# Patient Record
Sex: FEMALE | Race: WHITE | Employment: FULL TIME | URBAN - METROPOLITAN AREA
[De-identification: names, ages, dates, MRNs, and addresses within clinical notes are randomized per-mention and may not be internally consistent; named-entity substitution may affect disease eponyms.]

---

## 2018-06-30 PROBLEM — J02.9 SORE THROAT: Status: ACTIVE | Noted: 2018-06-30

## 2018-06-30 PROBLEM — R09.81 NASAL CONGESTION: Status: ACTIVE | Noted: 2018-06-30

## 2018-06-30 PROBLEM — R05.9 COUGH: Status: ACTIVE | Noted: 2018-06-30

## 2018-06-30 PROBLEM — J01.00 ACUTE NON-RECURRENT MAXILLARY SINUSITIS: Status: ACTIVE | Noted: 2018-06-30

## 2019-06-03 PROCEDURE — 87046 STOOL CULTR AEROBIC BACT EA: CPT | Performed by: PHYSICIAN ASSISTANT

## 2019-06-03 PROCEDURE — 87045 FECES CULTURE AEROBIC BACT: CPT | Performed by: PHYSICIAN ASSISTANT

## 2019-06-03 PROCEDURE — 89055 LEUKOCYTE ASSESSMENT FECAL: CPT | Performed by: PHYSICIAN ASSISTANT

## 2019-10-24 LAB — AMB EXT HPV: NEGATIVE

## 2022-11-02 LAB — AMB EXT HPV: NEGATIVE

## 2023-04-04 ENCOUNTER — OFFICE VISIT (OUTPATIENT)
Dept: FAMILY MEDICINE CLINIC | Facility: CLINIC | Age: 53
End: 2023-04-04
Payer: COMMERCIAL

## 2023-04-04 VITALS
DIASTOLIC BLOOD PRESSURE: 70 MMHG | BODY MASS INDEX: 46.86 KG/M2 | SYSTOLIC BLOOD PRESSURE: 124 MMHG | OXYGEN SATURATION: 98 % | TEMPERATURE: 98 F | HEART RATE: 86 BPM | RESPIRATION RATE: 16 BRPM | HEIGHT: 60.5 IN | WEIGHT: 245 LBS

## 2023-04-04 DIAGNOSIS — E66.01 CLASS 3 SEVERE OBESITY DUE TO EXCESS CALORIES WITHOUT SERIOUS COMORBIDITY WITH BODY MASS INDEX (BMI) OF 45.0 TO 49.9 IN ADULT (HCC): ICD-10-CM

## 2023-04-04 DIAGNOSIS — Z12.11 COLON CANCER SCREENING: ICD-10-CM

## 2023-04-04 DIAGNOSIS — Z12.31 ENCOUNTER FOR SCREENING MAMMOGRAM FOR MALIGNANT NEOPLASM OF BREAST: ICD-10-CM

## 2023-04-04 DIAGNOSIS — Z00.00 ROUTINE MEDICAL EXAM: Primary | ICD-10-CM

## 2023-04-04 DIAGNOSIS — J45.30 MILD PERSISTENT ASTHMA WITHOUT COMPLICATION: ICD-10-CM

## 2023-04-04 DIAGNOSIS — Z23 NEED FOR VACCINATION: ICD-10-CM

## 2023-04-04 DIAGNOSIS — Z00.00 LABORATORY EXAM ORDERED AS PART OF ROUTINE GENERAL MEDICAL EXAMINATION: ICD-10-CM

## 2023-04-04 DIAGNOSIS — Z83.49 FAMILY HISTORY OF THYROID DISEASE: ICD-10-CM

## 2023-04-04 DIAGNOSIS — Z01.84 IMMUNITY STATUS TESTING: ICD-10-CM

## 2023-04-04 PROBLEM — R05.9 COUGH: Status: RESOLVED | Noted: 2018-06-30 | Resolved: 2023-04-04

## 2023-04-04 PROBLEM — J02.9 SORE THROAT: Status: RESOLVED | Noted: 2018-06-30 | Resolved: 2023-04-04

## 2023-04-04 PROBLEM — R09.81 NASAL CONGESTION: Status: RESOLVED | Noted: 2018-06-30 | Resolved: 2023-04-04

## 2023-04-04 PROBLEM — J01.00 ACUTE NON-RECURRENT MAXILLARY SINUSITIS: Status: RESOLVED | Noted: 2018-06-30 | Resolved: 2023-04-04

## 2023-04-04 PROCEDURE — 90472 IMMUNIZATION ADMIN EACH ADD: CPT | Performed by: FAMILY MEDICINE

## 2023-04-04 PROCEDURE — 99203 OFFICE O/P NEW LOW 30 MIN: CPT | Performed by: FAMILY MEDICINE

## 2023-04-04 PROCEDURE — 90715 TDAP VACCINE 7 YRS/> IM: CPT | Performed by: FAMILY MEDICINE

## 2023-04-04 PROCEDURE — 3074F SYST BP LT 130 MM HG: CPT | Performed by: FAMILY MEDICINE

## 2023-04-04 PROCEDURE — 90471 IMMUNIZATION ADMIN: CPT | Performed by: FAMILY MEDICINE

## 2023-04-04 PROCEDURE — 3078F DIAST BP <80 MM HG: CPT | Performed by: FAMILY MEDICINE

## 2023-04-04 PROCEDURE — 3008F BODY MASS INDEX DOCD: CPT | Performed by: FAMILY MEDICINE

## 2023-04-04 PROCEDURE — 99386 PREV VISIT NEW AGE 40-64: CPT | Performed by: FAMILY MEDICINE

## 2023-04-04 RX ORDER — PHENTERMINE HYDROCHLORIDE 30 MG/1
30 CAPSULE ORAL EVERY MORNING
Qty: 30 CAPSULE | Refills: 0 | Status: SHIPPED | OUTPATIENT
Start: 2023-04-04

## 2023-04-04 RX ORDER — ALBUTEROL SULFATE 90 UG/1
2 AEROSOL, METERED RESPIRATORY (INHALATION) EVERY 6 HOURS PRN
Qty: 1 EACH | Refills: 2 | Status: SHIPPED | OUTPATIENT
Start: 2023-04-04

## 2023-04-04 RX ORDER — CETIRIZINE HYDROCHLORIDE 10 MG/1
10 TABLET ORAL DAILY
COMMUNITY

## 2023-04-04 RX ORDER — BECLOMETHASONE DIPROPIONATE
POWDER (GRAM) MISCELLANEOUS
Refills: 0 | Status: CANCELLED | OUTPATIENT
Start: 2023-04-04

## 2023-04-07 DIAGNOSIS — R73.09 ELEVATED GLUCOSE: Primary | ICD-10-CM

## 2023-04-09 LAB
ABSOLUTE BASOPHILS: 57 CELLS/UL (ref 0–200)
ABSOLUTE EOSINOPHILS: 200 CELLS/UL (ref 15–500)
ABSOLUTE LYMPHOCYTES: 2229 CELLS/UL (ref 850–3900)
ABSOLUTE MONOCYTES: 587 CELLS/UL (ref 200–950)
ABSOLUTE NEUTROPHILS: 2628 CELLS/UL (ref 1500–7800)
ALBUMIN/GLOBULIN RATIO: 1.7 (CALC) (ref 1–2.5)
ALBUMIN: 4 G/DL (ref 3.6–5.1)
ALKALINE PHOSPHATASE: 75 U/L (ref 37–153)
ALT: 26 U/L (ref 6–29)
AST: 28 U/L (ref 10–35)
BASOPHILS: 1 %
BILIRUBIN, TOTAL: 0.4 MG/DL (ref 0.2–1.2)
BUN: 13 MG/DL (ref 7–25)
CALCIUM: 8.8 MG/DL (ref 8.6–10.4)
CARBON DIOXIDE: 30 MMOL/L (ref 20–32)
CHLORIDE: 106 MMOL/L (ref 98–110)
CHOL/HDLC RATIO: 3.5 (CALC)
CHOLESTEROL, TOTAL: 176 MG/DL
CREATININE: 0.73 MG/DL (ref 0.5–1.03)
EGFR: 97 ML/MIN/1.73M2
EOSINOPHILS: 3.5 %
GLOBULIN: 2.4 G/DL (CALC) (ref 1.9–3.7)
GLUCOSE: 115 MG/DL (ref 65–99)
HDL CHOLESTEROL: 51 MG/DL
HEMATOCRIT: 40.5 % (ref 35–45)
HEMOGLOBIN A1C: 5.6 % OF TOTAL HGB
HEMOGLOBIN: 13.6 G/DL (ref 11.7–15.5)
LDL-CHOLESTEROL: 106 MG/DL (CALC)
LYMPHOCYTES: 39.1 %
MCH: 28.9 PG (ref 27–33)
MCHC: 33.6 G/DL (ref 32–36)
MCV: 86.2 FL (ref 80–100)
MONOCYTES: 10.3 %
MPV: 9.5 FL (ref 7.5–12.5)
NEUTROPHILS: 46.1 %
NON-HDL CHOLESTEROL: 125 MG/DL (CALC)
PLATELET COUNT: 277 THOUSAND/UL (ref 140–400)
POTASSIUM: 4.4 MMOL/L (ref 3.5–5.3)
PROTEIN, TOTAL: 6.4 G/DL (ref 6.1–8.1)
RDW: 13 % (ref 11–15)
RED BLOOD CELL COUNT: 4.7 MILLION/UL (ref 3.8–5.1)
SODIUM: 140 MMOL/L (ref 135–146)
TRIGLYCERIDES: 94 MG/DL
TSH: 1.17 MIU/L
VARICELLA ZOSTER VIRUS$AB (IGG): 352.7 INDEX
WHITE BLOOD CELL COUNT: 5.7 THOUSAND/UL (ref 3.8–10.8)

## 2023-04-13 ENCOUNTER — TELEPHONE (OUTPATIENT)
Dept: FAMILY MEDICINE CLINIC | Facility: CLINIC | Age: 53
End: 2023-04-13

## 2023-04-13 DIAGNOSIS — Z12.31 ENCOUNTER FOR SCREENING MAMMOGRAM FOR MALIGNANT NEOPLASM OF BREAST: Primary | ICD-10-CM

## 2023-04-13 NOTE — TELEPHONE ENCOUNTER
Received via fax the 11/2/22 Pap smear & HPV, 10/24/19 Pap & HPV & the 3/25/22 mammogram results from Dr. Ada Donaldson. Date of exam. External Results Console & Health Maintenance updated & full reports placed in Dr. Severiano Schiller in box for review.

## 2023-04-14 NOTE — TELEPHONE ENCOUNTER
Please inform patient that we received a mammogram report. It's date was 3/25/2022. patient states she had one done in January of this year but not sure if she was mistaken or if it was not included in the results from [facility] in [address] [place] [address] and [facility]  I placed an order for a mammogram as I think she is due. If she really had it done somewhere see if we can locate.

## 2023-05-04 ENCOUNTER — OFFICE VISIT (OUTPATIENT)
Dept: FAMILY MEDICINE CLINIC | Facility: CLINIC | Age: 53
End: 2023-05-04
Payer: COMMERCIAL

## 2023-05-04 VITALS
HEIGHT: 60.5 IN | SYSTOLIC BLOOD PRESSURE: 120 MMHG | DIASTOLIC BLOOD PRESSURE: 72 MMHG | OXYGEN SATURATION: 98 % | RESPIRATION RATE: 16 BRPM | BODY MASS INDEX: 44.18 KG/M2 | HEART RATE: 78 BPM | TEMPERATURE: 98 F | WEIGHT: 231 LBS

## 2023-05-04 DIAGNOSIS — J45.30 MILD PERSISTENT ASTHMA WITHOUT COMPLICATION: ICD-10-CM

## 2023-05-04 DIAGNOSIS — E66.01 CLASS 3 SEVERE OBESITY DUE TO EXCESS CALORIES WITHOUT SERIOUS COMORBIDITY WITH BODY MASS INDEX (BMI) OF 40.0 TO 44.9 IN ADULT (HCC): ICD-10-CM

## 2023-05-04 PROCEDURE — 3008F BODY MASS INDEX DOCD: CPT | Performed by: FAMILY MEDICINE

## 2023-05-04 PROCEDURE — 3078F DIAST BP <80 MM HG: CPT | Performed by: FAMILY MEDICINE

## 2023-05-04 PROCEDURE — 99213 OFFICE O/P EST LOW 20 MIN: CPT | Performed by: FAMILY MEDICINE

## 2023-05-04 PROCEDURE — 3074F SYST BP LT 130 MM HG: CPT | Performed by: FAMILY MEDICINE

## 2023-05-04 RX ORDER — PHENTERMINE HYDROCHLORIDE 30 MG/1
30 CAPSULE ORAL EVERY MORNING
Qty: 30 CAPSULE | Refills: 0 | Status: SHIPPED | OUTPATIENT
Start: 2023-05-04

## 2023-06-06 ENCOUNTER — OFFICE VISIT (OUTPATIENT)
Dept: FAMILY MEDICINE CLINIC | Facility: CLINIC | Age: 53
End: 2023-06-06
Payer: COMMERCIAL

## 2023-06-06 VITALS
DIASTOLIC BLOOD PRESSURE: 70 MMHG | TEMPERATURE: 98 F | OXYGEN SATURATION: 98 % | HEIGHT: 60.5 IN | SYSTOLIC BLOOD PRESSURE: 118 MMHG | WEIGHT: 220 LBS | RESPIRATION RATE: 16 BRPM | HEART RATE: 84 BPM | BODY MASS INDEX: 42.08 KG/M2

## 2023-06-06 DIAGNOSIS — Z23 NEED FOR VACCINATION: ICD-10-CM

## 2023-06-06 DIAGNOSIS — E66.01 CLASS 3 SEVERE OBESITY DUE TO EXCESS CALORIES WITHOUT SERIOUS COMORBIDITY WITH BODY MASS INDEX (BMI) OF 40.0 TO 44.9 IN ADULT (HCC): Primary | ICD-10-CM

## 2023-06-06 PROCEDURE — 3074F SYST BP LT 130 MM HG: CPT | Performed by: FAMILY MEDICINE

## 2023-06-06 PROCEDURE — 90750 HZV VACC RECOMBINANT IM: CPT | Performed by: FAMILY MEDICINE

## 2023-06-06 PROCEDURE — 90471 IMMUNIZATION ADMIN: CPT | Performed by: FAMILY MEDICINE

## 2023-06-06 PROCEDURE — 99213 OFFICE O/P EST LOW 20 MIN: CPT | Performed by: FAMILY MEDICINE

## 2023-06-06 PROCEDURE — 3008F BODY MASS INDEX DOCD: CPT | Performed by: FAMILY MEDICINE

## 2023-06-06 PROCEDURE — 3078F DIAST BP <80 MM HG: CPT | Performed by: FAMILY MEDICINE

## 2023-06-06 RX ORDER — PHENTERMINE HYDROCHLORIDE 30 MG/1
30 CAPSULE ORAL EVERY MORNING
Qty: 30 CAPSULE | Refills: 0 | Status: SHIPPED | OUTPATIENT
Start: 2023-06-06

## 2023-07-05 ENCOUNTER — OFFICE VISIT (OUTPATIENT)
Dept: FAMILY MEDICINE CLINIC | Facility: CLINIC | Age: 53
End: 2023-07-05
Payer: COMMERCIAL

## 2023-07-05 VITALS
OXYGEN SATURATION: 97 % | HEART RATE: 81 BPM | SYSTOLIC BLOOD PRESSURE: 112 MMHG | DIASTOLIC BLOOD PRESSURE: 70 MMHG | RESPIRATION RATE: 16 BRPM | HEIGHT: 60.5 IN | BODY MASS INDEX: 41.31 KG/M2 | WEIGHT: 216 LBS | TEMPERATURE: 98 F

## 2023-07-05 DIAGNOSIS — E66.01 CLASS 3 SEVERE OBESITY DUE TO EXCESS CALORIES WITHOUT SERIOUS COMORBIDITY WITH BODY MASS INDEX (BMI) OF 40.0 TO 44.9 IN ADULT (HCC): ICD-10-CM

## 2023-07-05 PROBLEM — E66.813 CLASS 3 SEVERE OBESITY DUE TO EXCESS CALORIES WITHOUT SERIOUS COMORBIDITY WITH BODY MASS INDEX (BMI) OF 40.0 TO 44.9 IN ADULT: Status: ACTIVE | Noted: 2023-07-05

## 2023-07-05 PROBLEM — E66.813 CLASS 3 SEVERE OBESITY DUE TO EXCESS CALORIES WITHOUT SERIOUS COMORBIDITY WITH BODY MASS INDEX (BMI) OF 40.0 TO 44.9 IN ADULT (HCC): Status: ACTIVE | Noted: 2023-07-05

## 2023-07-05 PROCEDURE — 3008F BODY MASS INDEX DOCD: CPT | Performed by: FAMILY MEDICINE

## 2023-07-05 PROCEDURE — 3074F SYST BP LT 130 MM HG: CPT | Performed by: FAMILY MEDICINE

## 2023-07-05 PROCEDURE — 3078F DIAST BP <80 MM HG: CPT | Performed by: FAMILY MEDICINE

## 2023-07-05 PROCEDURE — 99213 OFFICE O/P EST LOW 20 MIN: CPT | Performed by: FAMILY MEDICINE

## 2023-07-05 RX ORDER — PHENTERMINE HYDROCHLORIDE 30 MG/1
30 CAPSULE ORAL EVERY MORNING
Qty: 30 CAPSULE | Refills: 0 | Status: SHIPPED | OUTPATIENT
Start: 2023-07-05

## 2023-08-07 ENCOUNTER — OFFICE VISIT (OUTPATIENT)
Dept: FAMILY MEDICINE CLINIC | Facility: CLINIC | Age: 53
End: 2023-08-07
Payer: COMMERCIAL

## 2023-08-07 VITALS
DIASTOLIC BLOOD PRESSURE: 70 MMHG | WEIGHT: 208 LBS | BODY MASS INDEX: 39.78 KG/M2 | SYSTOLIC BLOOD PRESSURE: 116 MMHG | OXYGEN SATURATION: 98 % | HEART RATE: 87 BPM | HEIGHT: 60.5 IN | RESPIRATION RATE: 16 BRPM | TEMPERATURE: 98 F

## 2023-08-07 DIAGNOSIS — E66.09 CLASS 2 OBESITY DUE TO EXCESS CALORIES WITHOUT SERIOUS COMORBIDITY WITH BODY MASS INDEX (BMI) OF 39.0 TO 39.9 IN ADULT: ICD-10-CM

## 2023-08-07 PROBLEM — E66.813 CLASS 3 SEVERE OBESITY DUE TO EXCESS CALORIES WITHOUT SERIOUS COMORBIDITY WITH BODY MASS INDEX (BMI) OF 40.0 TO 44.9 IN ADULT: Status: RESOLVED | Noted: 2023-07-05 | Resolved: 2023-08-07

## 2023-08-07 PROBLEM — E66.01 CLASS 3 SEVERE OBESITY DUE TO EXCESS CALORIES WITHOUT SERIOUS COMORBIDITY WITH BODY MASS INDEX (BMI) OF 40.0 TO 44.9 IN ADULT (HCC): Status: RESOLVED | Noted: 2023-07-05 | Resolved: 2023-08-07

## 2023-08-07 PROBLEM — E66.813 CLASS 3 SEVERE OBESITY DUE TO EXCESS CALORIES WITHOUT SERIOUS COMORBIDITY WITH BODY MASS INDEX (BMI) OF 40.0 TO 44.9 IN ADULT (HCC): Status: RESOLVED | Noted: 2023-07-05 | Resolved: 2023-08-07

## 2023-08-07 RX ORDER — PHENTERMINE HYDROCHLORIDE 30 MG/1
30 CAPSULE ORAL EVERY MORNING
Qty: 30 CAPSULE | Refills: 0 | Status: SHIPPED | OUTPATIENT
Start: 2023-08-07

## 2023-09-05 ENCOUNTER — ANESTHESIA EVENT (OUTPATIENT)
Dept: ENDOSCOPY | Facility: HOSPITAL | Age: 53
End: 2023-09-05
Payer: COMMERCIAL

## 2023-09-06 ENCOUNTER — HOSPITAL ENCOUNTER (OUTPATIENT)
Facility: HOSPITAL | Age: 53
Setting detail: HOSPITAL OUTPATIENT SURGERY
Discharge: HOME | End: 2023-09-06
Attending: INTERNAL MEDICINE | Admitting: INTERNAL MEDICINE
Payer: COMMERCIAL

## 2023-09-06 ENCOUNTER — ANESTHESIA (OUTPATIENT)
Dept: ENDOSCOPY | Facility: HOSPITAL | Age: 53
End: 2023-09-06
Payer: COMMERCIAL

## 2023-09-06 VITALS
SYSTOLIC BLOOD PRESSURE: 121 MMHG | DIASTOLIC BLOOD PRESSURE: 72 MMHG | TEMPERATURE: 98 F | OXYGEN SATURATION: 100 % | BODY MASS INDEX: 40.84 KG/M2 | HEIGHT: 60 IN | HEART RATE: 76 BPM | RESPIRATION RATE: 20 BRPM | WEIGHT: 208 LBS

## 2023-09-06 DIAGNOSIS — Z12.11 COLON CANCER SCREENING: ICD-10-CM

## 2023-09-06 PROCEDURE — 0DBN8ZZ EXCISION OF SIGMOID COLON, VIA NATURAL OR ARTIFICIAL OPENING ENDOSCOPIC: ICD-10-PCS | Performed by: INTERNAL MEDICINE

## 2023-09-06 PROCEDURE — 88305 TISSUE EXAM BY PATHOLOGIST: CPT | Performed by: INTERNAL MEDICINE

## 2023-09-06 PROCEDURE — 0DBM8ZZ EXCISION OF DESCENDING COLON, VIA NATURAL OR ARTIFICIAL OPENING ENDOSCOPIC: ICD-10-PCS | Performed by: INTERNAL MEDICINE

## 2023-09-06 RX ORDER — SODIUM CHLORIDE, SODIUM LACTATE, POTASSIUM CHLORIDE, CALCIUM CHLORIDE 600; 310; 30; 20 MG/100ML; MG/100ML; MG/100ML; MG/100ML
INJECTION, SOLUTION INTRAVENOUS CONTINUOUS
Status: DISCONTINUED | OUTPATIENT
Start: 2023-09-06 | End: 2023-09-06

## 2023-09-06 RX ORDER — LIDOCAINE HYDROCHLORIDE 10 MG/ML
INJECTION, SOLUTION EPIDURAL; INFILTRATION; INTRACAUDAL; PERINEURAL AS NEEDED
Status: DISCONTINUED | OUTPATIENT
Start: 2023-09-06 | End: 2023-09-06 | Stop reason: SURG

## 2023-09-06 RX ADMIN — LIDOCAINE HYDROCHLORIDE 25 MG: 10 INJECTION, SOLUTION EPIDURAL; INFILTRATION; INTRACAUDAL; PERINEURAL at 12:32:00

## 2023-09-06 RX ADMIN — SODIUM CHLORIDE, SODIUM LACTATE, POTASSIUM CHLORIDE, CALCIUM CHLORIDE: 600; 310; 30; 20 INJECTION, SOLUTION INTRAVENOUS at 12:24:00

## 2023-09-06 NOTE — DISCHARGE INSTRUCTIONS

## 2023-09-06 NOTE — ANESTHESIA POSTPROCEDURE EVALUATION
[facility] Patient Status:  Hospital Outpatient Surgery   Age/Gender 52year old female MRN [id number]   Location [address] [facility] 28 Attending Mike Tijerina, [address] [facility] Day # 0 PCP Alcira Kendrick MD       Anesthesia Post-op Note    COLONOSCOPY with cold snare polypectomy    Procedure Summary       Date: 09/06/23 Room / Location: [facility] ENDOSCOPY 02 / [facility] ENDOSCOPY    Anesthesia Start: 0385 Anesthesia Stop: 7319    Procedure: COLONOSCOPY with cold snare polypectomy Diagnosis:       Colon cancer screening      (polyps, hemorrhoids)    Surgeons: Mike Tijerina DO Anesthesiologist: Godwin Perez MD    Anesthesia Type: MAC ASA Status: 3            Anesthesia Type: MAC    Vitals Value Taken Time   /67 09/06/23 1305   Temp 98.0 09/06/23 1307   Pulse 89 09/06/23 1307   Resp 16 09/06/23 1307   SpO2 97 % 09/06/23 1307   Vitals shown include unvalidated device data. Patient Location: Endoscopy    Anesthesia Type: MAC    Airway Patency: patent    Postop Pain Control: adequate    Mental Status: mildly sedated but able to meaningfully participate in the post-anesthesia evaluation    Nausea/Vomiting: none    Cardiopulmonary/Hydration status: stable euvolemic    Complications: no apparent anesthesia related complications    Postop vital signs: stable    Dental Exam: Unchanged from Preop    Patient to be discharged home when criteria met.

## 2023-09-06 NOTE — H&P
History & Physical Examination    Patient Name: David Porter  MRN: [id number]  CSN: [id number]  YOB: 1970    Diagnosis: screening    Present Illness: as above    Phentermine HCl 30 MG Oral Cap, Take 1 capsule (30 mg total) by mouth every morning., Disp: 30 capsule, Rfl: 0, 8/23/2023  cetirizine 10 MG Oral Tab, Take 1 tablet (10 mg total) by mouth 2 (two) times daily. , Disp: , Rfl: , 9/4/2023  PEG 3350-KCl-Na Bicarb-NaCl 420 g Oral Recon Soln, Take 4,000 mL by mouth As Directed. (Patient not taking: Reported on 7/5/2023), Disp: 4000 mL, Rfl: 0  Beclomethasone Diprop HFA 80 MCG/ACT Inhalation Aerosol, Breath Activated, Inhale 2 puffs into the lungs in the morning and 2 puffs before bedtime. , Disp: 1 each, Rfl: 5, More than a month  albuterol (PROAIR HFA) 108 (90 Base) MCG/ACT Inhalation Aero Soln, Inhale 2 puffs into the lungs every 6 (six) hours as needed for Wheezing or Shortness of Breath., Disp: 1 each, Rfl: 2, More than a month      lactated ringers infusion, , Intravenous, Continuous        Allergies:   Demerol Hcl [Meperi*    HALLUCINATION    Past Medical History:   Diagnosis Date    Back problem     Binge-eating and purging type anorexia nervosa     20-30's was bad    Mild intermittent asthma     Triggers: cold weather, URI    Mild persistent asthma without complication 53/18/9992    Osteoarthritis     Visual impairment     glasses     Past Surgical History:   Procedure Laterality Date    AMPUTATION FINGER/THUMB Right     partial 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, caught underneath a table    APPENDECTOMY      CHOLECYSTECTOMY      WISDOM TEETH REMOVED       Family History   Problem Relation Age of Onset    Hypertension Mother     Other (khari's esophagitis) Mother     High Cholesterol Father     Prostate Cancer Father     Arthritis Father     Asthma Father     No Known Problems Sister     No Known Problems Sister     No Known Problems Maternal Grandmother     Stroke Paternal Grandmother     Heart Disease Paternal Grandmother     Cancer Paternal Grandfather         lung (smoker)    Thyroid disease Maternal Aunt      Social History    Tobacco Use      Smoking status: Never      Smokeless tobacco: Never    Alcohol use: Yes      Comment: 1/2 glass wine 1/yr, never a problem      SYSTEM Check if Review is Normal Check if Physical Exam is Normal If not normal, please explain:   HEENT [ x] [x ]    NECK & BACK [ x] [ x]    HEART [ x] [x ]    LUNGS [ x] [ x]    ABDOMEN [ x] [x ]    UROGENITAL [ x] [ ] Exam declined   EXTREMITIES [ x] [x ]    OTHER        [ x ] I have discussed the risks and benefits and alternatives with the patient/family. They understand and agree to proceed with plan of care. [ x ] I have reviewed the History and Physical done within the last 30 days. Any changes noted above.     [address] [place] [address],[address], DO  9/6/2023  12:21 PM

## 2023-09-06 NOTE — OPERATIVE REPORT
Operative Report-Colonoscopy with snare polypectomy    Jabier Fairbanks 1/27/1970   [facility] [id number] MRN [id number]   Admission Date 9/6/2023 Operation Date 9/6/2023   Attending Physician Sayra Mohr DO Operating Physician Michael Rivera DO     PREOPERATIVE DIAGNOSIS/INDICATION: Screening  POSTOPERTATIVE DIAGNOSIS: Polyps, Hemorrhoids  PROCEDURE PERFORMED: COLONOSCOPY  INFORMED CONSENT:  Once a brief history and physical examination was performed, the risks, benefits and alternatives to the procedure were discussed with the patient and/or family and informed consent was obtained. The risks of sedation, perforation, missed lesions and need for surgery were all discussed. Patient expressed understanding of the risks and agreed to proceed. PROCEDURE DESCRIPTION:The patient was then brought to the endoscopy suite where her pulse, pulse oximetry and blood pressure were monitored. She was placed in the left lateral decubitus position and deep sedation was administered. Once adequate sedation was achieved, a rectal exam was performed which was normal. A lubricated tip of an Olympus video colonoscope was then inserted through the rectum and gently manipulated under direct visualization to the cecal pole and the terminal ileum. The quality of the preparation was good. Upon withdrawal of the colonoscope, careful visualization of the mucosa was performed. [place] Prep Score: Right Colon 3 Transverse colon 3 Left colon 3  FINDINGS/THERAPEUTICS:  TERMINAL ILEUM: The Terminal Ileum was normal.   COLON: There were two 5-6 mm, sessile polyps in the descending colon which were removed with the cold snare. There was a 5 mm, sessile polyp in the sigmoid polyp which was removed with the cold snare. Two passes were made to the right colon. RECTUM: Grade I Internal hemorrhoids.   RECOMMENDATIONS:   Post Colonoscopy/polypectomy precautions, watch for bleeding, infection, perforation, adverse drug reactions   Follow biopsies. Repeat colonoscopy in 5 years.   CC Report:   Herber Saunders DO  9/6/2023  12:52 PM

## 2023-09-11 ENCOUNTER — TELEMEDICINE (OUTPATIENT)
Dept: FAMILY MEDICINE CLINIC | Facility: CLINIC | Age: 53
End: 2023-09-11

## 2023-09-11 DIAGNOSIS — E66.09 CLASS 2 OBESITY DUE TO EXCESS CALORIES WITHOUT SERIOUS COMORBIDITY WITH BODY MASS INDEX (BMI) OF 39.0 TO 39.9 IN ADULT: ICD-10-CM

## 2023-09-11 PROCEDURE — 99213 OFFICE O/P EST LOW 20 MIN: CPT | Performed by: FAMILY MEDICINE

## 2023-09-11 RX ORDER — PHENTERMINE HYDROCHLORIDE 30 MG/1
30 CAPSULE ORAL EVERY MORNING
Qty: 30 CAPSULE | Refills: 0 | Status: SHIPPED | OUTPATIENT
Start: 2023-09-11

## 2023-09-11 NOTE — PROGRESS NOTES
Video Visit- Johanna Ryder  This is a telemedicine visit with live, interactive video and audio. Kale Wu understands and accepts financial responsibility for any deductible, co-insurance and/or co-pays associated with this service for the date of 09/11/23. Duration of the service: 5 minutes  9:08-9:13PM      Patient presents with:  Weight Check       HPI:  Weight-   Scale at home says 206# so down 2# from last month. Feels this is good despite stopping phentermine x 2 wks for her c-scope. Also had some new stressors. Phentermine 30 mg was added 4/4/23. Med helps prevent the spiraling after making one bad decision and saying the hell with it and then make many bad decisions. Used to have bulemia. Feels less focused on food. Making more rational decisions about eating than emotional decisions. Walking 2mi 4d/wk with TJ her life partner. Has a little dry mouth. Stops the obsessions of eating. History 4/4/23: used to exercise with a , has done weight watchers. Looses weight, but then regains it. Eating is emotional. Had an eating disorder and bad in her 20-30's. Binged and purged. Would do at the end of the night when all was done as her treat to herself. Having a family with a routine has helped and harder to do those things with others around. Will focus on other enjoyable activities, exercise, and eating healthy. Open to phentermine. Taking: phentermine 30mg  Started at: 4/4/23, 245#, BMI 47.04  Since Last visit: lost 8 #, 1 months  Total Difference: 37#, 4 months  Other changes: down 7 BMI points, less focused on food, making more rational as opposed to emotional decision, clothes are fitting better.      Phentermine scripts: 1st- 4/4/23, 2nd- 5/4/23, 3rd- 7/5/23, 4th- 8/7/23, 5th-9/11/23     Denies- chest pain, palpitations, sob, syncope            Past Medical History:   Diagnosis Date    Back problem     Binge-eating and purging type anorexia nervosa     20-30's was bad    Mild intermittent asthma     Triggers: cold weather, URI    Mild persistent asthma without complication 73/66/5026    Osteoarthritis     Visual impairment     glasses       Past Surgical History:   Procedure Laterality Date    AMPUTATION FINGER/THUMB Right     partial 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, caught underneath a table    APPENDECTOMY      CHOLECYSTECTOMY      COLONOSCOPY N/A 09/06/2023    repeat 5 yrs, Melissa CernaDO;  Location:  ENDOSCOPY, [facility]    WISDOM TEETH REMOVED         Family History   Problem Relation Age of Onset    Hypertension Mother     Other (khari's esophagitis) Mother     High Cholesterol Father     Prostate Cancer Father     Arthritis Father     Asthma Father     No Known Problems Sister     No Known Problems Sister     No Known Problems Maternal Grandmother     Stroke Paternal Grandmother     Heart Disease Paternal Grandmother     Cancer Paternal Grandfather         lung (smoker)    Thyroid disease Maternal Aunt            Physical Exam:  There were no vitals taken for this visit. GENERAL APPEARANCE:  Alert, no acute distress, appears stated age  HEENT:  NCAT, EOMI, mucus membranes moist  NECK:  No obvious goiter  PULMONARY:  Speaking in full sentences comfortably, normal work of breathing  ABDOMEN:  + not/obese  MUSCULOSKELETAL: Sitting upright. NEUROLOGIC:  Cranial nerves 2-12 grossly intact. PSYCHIATRIC:  Normal mood, affect, and hygiene. Assessment/Plan:  1. Class 2 obesity due to excess calories without serious comorbidity with body mass index (BMI) of 39.0 to 39.9 in adult  - Phentermine HCl 30 MG Oral Cap; Take 1 capsule (30 mg total) by mouth every morning. Dispense: 30 capsule; Refill: 0           Return in about 1 month (around 10/11/2023) for f/u lifestyle changes. Nicholaus Bosworth, MD      Please note that the following visit was completed using two-way, real-time interactive audio and visual communication. This has been done in good brown to provide continuity of care in the best interest of the provider-patient relationship, due to the ongoing public health crisis/national emergency and because of restrictions of visitation. There are limitations of this visit as physical examination was limited. Appropriate medical decision-making and tests are ordered as detailed in the plan of care above.

## 2023-10-16 ENCOUNTER — OFFICE VISIT (OUTPATIENT)
Dept: FAMILY MEDICINE CLINIC | Facility: CLINIC | Age: 53
End: 2023-10-16
Payer: COMMERCIAL

## 2023-10-16 VITALS
HEART RATE: 78 BPM | OXYGEN SATURATION: 98 % | HEIGHT: 60 IN | SYSTOLIC BLOOD PRESSURE: 114 MMHG | DIASTOLIC BLOOD PRESSURE: 70 MMHG | WEIGHT: 211 LBS | BODY MASS INDEX: 41.43 KG/M2 | TEMPERATURE: 98 F | RESPIRATION RATE: 16 BRPM

## 2023-10-16 DIAGNOSIS — E66.01 CLASS 3 SEVERE OBESITY DUE TO EXCESS CALORIES WITHOUT SERIOUS COMORBIDITY WITH BODY MASS INDEX (BMI) OF 40.0 TO 44.9 IN ADULT (HCC): Primary | ICD-10-CM

## 2023-10-16 DIAGNOSIS — Z23 NEED FOR VACCINATION: ICD-10-CM

## 2023-10-16 PROCEDURE — 3008F BODY MASS INDEX DOCD: CPT | Performed by: FAMILY MEDICINE

## 2023-10-16 PROCEDURE — 90686 IIV4 VACC NO PRSV 0.5 ML IM: CPT | Performed by: FAMILY MEDICINE

## 2023-10-16 PROCEDURE — 3078F DIAST BP <80 MM HG: CPT | Performed by: FAMILY MEDICINE

## 2023-10-16 PROCEDURE — 90471 IMMUNIZATION ADMIN: CPT | Performed by: FAMILY MEDICINE

## 2023-10-16 PROCEDURE — 3074F SYST BP LT 130 MM HG: CPT | Performed by: FAMILY MEDICINE

## 2023-10-16 PROCEDURE — 99213 OFFICE O/P EST LOW 20 MIN: CPT | Performed by: FAMILY MEDICINE

## 2023-10-16 RX ORDER — PHENTERMINE HYDROCHLORIDE 30 MG/1
30 CAPSULE ORAL EVERY MORNING
Qty: 30 CAPSULE | Refills: 0 | Status: SHIPPED | OUTPATIENT
Start: 2023-10-16

## 2023-11-17 ENCOUNTER — OFFICE VISIT (OUTPATIENT)
Dept: FAMILY MEDICINE CLINIC | Facility: CLINIC | Age: 53
End: 2023-11-17
Payer: COMMERCIAL

## 2023-11-17 VITALS
HEART RATE: 84 BPM | SYSTOLIC BLOOD PRESSURE: 112 MMHG | RESPIRATION RATE: 16 BRPM | WEIGHT: 212 LBS | DIASTOLIC BLOOD PRESSURE: 70 MMHG | TEMPERATURE: 98 F | OXYGEN SATURATION: 98 % | HEIGHT: 60 IN | BODY MASS INDEX: 41.62 KG/M2

## 2023-11-17 DIAGNOSIS — E66.01 CLASS 3 SEVERE OBESITY DUE TO EXCESS CALORIES WITHOUT SERIOUS COMORBIDITY WITH BODY MASS INDEX (BMI) OF 40.0 TO 44.9 IN ADULT (HCC): Primary | ICD-10-CM

## 2023-11-17 PROCEDURE — 3078F DIAST BP <80 MM HG: CPT | Performed by: FAMILY MEDICINE

## 2023-11-17 PROCEDURE — 99214 OFFICE O/P EST MOD 30 MIN: CPT | Performed by: FAMILY MEDICINE

## 2023-11-17 PROCEDURE — 3008F BODY MASS INDEX DOCD: CPT | Performed by: FAMILY MEDICINE

## 2023-11-17 PROCEDURE — 3074F SYST BP LT 130 MM HG: CPT | Performed by: FAMILY MEDICINE

## 2023-11-17 RX ORDER — PHENTERMINE AND TOPIRAMATE 7.5; 46 MG/1; MG/1
1 CAPSULE, EXTENDED RELEASE ORAL EVERY MORNING
Qty: 30 CAPSULE | Refills: 0 | Status: SHIPPED | OUTPATIENT
Start: 2023-11-17 | End: 2023-12-17

## 2023-11-17 RX ORDER — TOPIRAMATE 25 MG/1
25 TABLET ORAL 2 TIMES DAILY
Qty: 60 TABLET | Refills: 0 | Status: SHIPPED | OUTPATIENT
Start: 2023-11-17

## 2023-11-19 ENCOUNTER — HOSPITAL ENCOUNTER (OUTPATIENT)
Age: 53
Discharge: HOME | End: 2023-11-19
Payer: COMMERCIAL

## 2023-11-19 ENCOUNTER — APPOINTMENT (OUTPATIENT)
Dept: CT IMAGING | Age: 53
End: 2023-11-19
Attending: PHYSICIAN ASSISTANT
Payer: COMMERCIAL

## 2023-11-19 VITALS
TEMPERATURE: 98 F | OXYGEN SATURATION: 97 % | RESPIRATION RATE: 16 BRPM | WEIGHT: 210 LBS | HEIGHT: 60 IN | HEART RATE: 97 BPM | DIASTOLIC BLOOD PRESSURE: 99 MMHG | BODY MASS INDEX: 41.23 KG/M2 | SYSTOLIC BLOOD PRESSURE: 155 MMHG

## 2023-11-19 DIAGNOSIS — R10.9 ACUTE RIGHT FLANK PAIN: Primary | ICD-10-CM

## 2023-11-19 LAB
#MXD IC: 0.7 X10ˆ3/UL (ref 0.1–1)
BILIRUB UR QL STRIP: NEGATIVE
BUN BLD-MCNC: 17 MG/DL (ref 7–18)
CHLORIDE BLD-SCNC: 103 MMOL/L (ref 98–112)
CLARITY UR: CLEAR
CO2 BLD-SCNC: 26 MMOL/L (ref 21–32)
COLOR UR: YELLOW
CREAT BLD-MCNC: 0.7 MG/DL
EGFRCR SERPLBLD CKD-EPI 2021: 102 ML/MIN/1.73M2 (ref 60–?)
GLUCOSE BLD-MCNC: 98 MG/DL (ref 70–99)
GLUCOSE UR STRIP-MCNC: NEGATIVE MG/DL
HCT VFR BLD AUTO: 41 %
HCT VFR BLD CALC: 40 %
HGB BLD-MCNC: 14 G/DL
ISTAT IONIZED CALCIUM FOR CHEM 8: 1.24 MMOL/L (ref 1.12–1.32)
KETONES UR STRIP-MCNC: NEGATIVE MG/DL
LEUKOCYTE ESTERASE UR QL STRIP: NEGATIVE
LYMPHOCYTES # BLD AUTO: 2.6 X10ˆ3/UL (ref 1–4)
LYMPHOCYTES NFR BLD AUTO: 39.4 %
MCH RBC QN AUTO: 29.7 PG (ref 26–34)
MCHC RBC AUTO-ENTMCNC: 34.1 G/DL (ref 31–37)
MCV RBC AUTO: 86.9 FL (ref 80–100)
MIXED CELL %: 10.4 %
NEUTROPHILS # BLD AUTO: 3.4 X10ˆ3/UL (ref 1.5–7.7)
NEUTROPHILS NFR BLD AUTO: 50.2 %
NITRITE UR QL STRIP: NEGATIVE
PH UR STRIP: 6 [PH]
PLATELET # BLD AUTO: 314 X10ˆ3/UL (ref 150–450)
POTASSIUM BLD-SCNC: 4.2 MMOL/L (ref 3.6–5.1)
PROT UR STRIP-MCNC: NEGATIVE MG/DL
RBC # BLD AUTO: 4.72 X10ˆ6/UL
SODIUM BLD-SCNC: 140 MMOL/L (ref 136–145)
SP GR UR STRIP: >=1.03
UROBILINOGEN UR STRIP-ACNC: <2 MG/DL
WBC # BLD AUTO: 6.7 X10ˆ3/UL (ref 4–11)

## 2023-11-19 PROCEDURE — 80047 BASIC METABLC PNL IONIZED CA: CPT | Performed by: PHYSICIAN ASSISTANT

## 2023-11-19 PROCEDURE — 81002 URINALYSIS NONAUTO W/O SCOPE: CPT | Performed by: PHYSICIAN ASSISTANT

## 2023-11-19 PROCEDURE — 74176 CT ABD & PELVIS W/O CONTRAST: CPT | Performed by: PHYSICIAN ASSISTANT

## 2023-11-19 PROCEDURE — 96365 THER/PROPH/DIAG IV INF INIT: CPT | Performed by: PHYSICIAN ASSISTANT

## 2023-11-19 PROCEDURE — 99204 OFFICE O/P NEW MOD 45 MIN: CPT | Performed by: PHYSICIAN ASSISTANT

## 2023-11-19 PROCEDURE — 85025 COMPLETE CBC W/AUTO DIFF WBC: CPT | Performed by: PHYSICIAN ASSISTANT

## 2023-11-19 RX ORDER — KETOROLAC TROMETHAMINE 30 MG/ML
15 INJECTION, SOLUTION INTRAMUSCULAR; INTRAVENOUS ONCE
Status: COMPLETED | OUTPATIENT
Start: 2023-11-19 | End: 2023-11-19

## 2023-11-19 RX ORDER — CYCLOBENZAPRINE HCL 10 MG
10 TABLET ORAL 3 TIMES DAILY PRN
Qty: 20 TABLET | Refills: 0 | Status: SHIPPED | OUTPATIENT
Start: 2023-11-19 | End: 2023-11-26

## 2023-11-19 NOTE — DISCHARGE INSTRUCTIONS
Watch for rash that may represent shingles that this occurs need to be reevaluated. Relaxant as needed this medication can make you drowsy do not take this medication if you will be operating motor vehicle.

## 2023-11-19 NOTE — ED INITIAL ASSESSMENT (HPI)
Pt with c/o of lower right back pain that radiates to her abdomen. Pain is constant and certain movements or positions cause a sharp shooting pain. States that when she stands she has a tendency to lean her body left to avoid pain. Feels better when walking worse when laying down. Pain started on Friday     Pt has tried advil/tylenol combo  or just tylenol for pain.

## 2023-11-22 ENCOUNTER — TELEPHONE (OUTPATIENT)
Dept: FAMILY MEDICINE CLINIC | Facility: CLINIC | Age: 53
End: 2023-11-22

## 2023-11-22 NOTE — TELEPHONE ENCOUNTER
PA submitted today via CMM for the Qsymia 7.5-46 mg ER Capsule. Received the approval response right away. Your request has been approved  [id number]; Review Type:Prior Auth; Coverage Start Date:10/23/2023; Coverage End Date:05/20/2024;      Gricel Aguayo at [facility] notified of the approval.

## 2023-12-21 ENCOUNTER — OFFICE VISIT (OUTPATIENT)
Dept: FAMILY MEDICINE CLINIC | Facility: CLINIC | Age: 53
End: 2023-12-21
Payer: COMMERCIAL

## 2023-12-21 VITALS
OXYGEN SATURATION: 99 % | HEART RATE: 81 BPM | WEIGHT: 211 LBS | SYSTOLIC BLOOD PRESSURE: 110 MMHG | BODY MASS INDEX: 41.43 KG/M2 | TEMPERATURE: 98 F | DIASTOLIC BLOOD PRESSURE: 70 MMHG | HEIGHT: 60 IN | RESPIRATION RATE: 16 BRPM

## 2023-12-21 DIAGNOSIS — E66.01 CLASS 3 SEVERE OBESITY DUE TO EXCESS CALORIES WITHOUT SERIOUS COMORBIDITY WITH BODY MASS INDEX (BMI) OF 40.0 TO 44.9 IN ADULT (HCC): Primary | ICD-10-CM

## 2023-12-21 DIAGNOSIS — R31.29 OTHER MICROSCOPIC HEMATURIA: ICD-10-CM

## 2023-12-21 LAB
BILIRUB UR QL STRIP.AUTO: NEGATIVE
CLARITY UR REFRACT.AUTO: CLEAR
COLOR UR AUTO: YELLOW
GLUCOSE UR STRIP.AUTO-MCNC: NORMAL MG/DL
KETONES UR STRIP.AUTO-MCNC: NEGATIVE MG/DL
LEUKOCYTE ESTERASE UR QL STRIP.AUTO: NEGATIVE
NITRITE UR QL STRIP.AUTO: NEGATIVE
PH UR STRIP.AUTO: 5 [PH] (ref 5–8)
PROT UR STRIP.AUTO-MCNC: NEGATIVE MG/DL
RBC UR QL AUTO: NEGATIVE
SP GR UR STRIP.AUTO: 1.03 (ref 1–1.03)
UROBILINOGEN UR STRIP.AUTO-MCNC: NORMAL MG/DL

## 2023-12-21 PROCEDURE — 81003 URINALYSIS AUTO W/O SCOPE: CPT | Performed by: FAMILY MEDICINE

## 2023-12-21 PROCEDURE — 3078F DIAST BP <80 MM HG: CPT | Performed by: FAMILY MEDICINE

## 2023-12-21 PROCEDURE — 99213 OFFICE O/P EST LOW 20 MIN: CPT | Performed by: FAMILY MEDICINE

## 2023-12-21 PROCEDURE — 3074F SYST BP LT 130 MM HG: CPT | Performed by: FAMILY MEDICINE

## 2023-12-21 PROCEDURE — 3008F BODY MASS INDEX DOCD: CPT | Performed by: FAMILY MEDICINE

## 2023-12-21 RX ORDER — PHENTERMINE AND TOPIRAMATE 7.5; 46 MG/1; MG/1
1 CAPSULE, EXTENDED RELEASE ORAL DAILY
COMMUNITY
End: 2023-12-21

## 2023-12-21 RX ORDER — PHENTERMINE AND TOPIRAMATE 7.5; 46 MG/1; MG/1
1 CAPSULE, EXTENDED RELEASE ORAL DAILY
Qty: 90 CAPSULE | Refills: 0 | Status: SHIPPED | OUTPATIENT
Start: 2023-12-21

## 2024-03-20 ENCOUNTER — OFFICE VISIT (OUTPATIENT)
Dept: FAMILY MEDICINE CLINIC | Facility: CLINIC | Age: 54
End: 2024-03-20
Payer: COMMERCIAL

## 2024-03-20 VITALS
SYSTOLIC BLOOD PRESSURE: 114 MMHG | HEIGHT: 60 IN | DIASTOLIC BLOOD PRESSURE: 70 MMHG | OXYGEN SATURATION: 99 % | TEMPERATURE: 98 F | BODY MASS INDEX: 41.62 KG/M2 | WEIGHT: 212 LBS | RESPIRATION RATE: 16 BRPM | HEART RATE: 72 BPM

## 2024-03-20 DIAGNOSIS — E66.01 CLASS 3 SEVERE OBESITY DUE TO EXCESS CALORIES WITHOUT SERIOUS COMORBIDITY WITH BODY MASS INDEX (BMI) OF 40.0 TO 44.9 IN ADULT (HCC): ICD-10-CM

## 2024-03-20 PROCEDURE — 99213 OFFICE O/P EST LOW 20 MIN: CPT | Performed by: FAMILY MEDICINE

## 2024-03-20 RX ORDER — PHENTERMINE AND TOPIRAMATE 7.5; 46 MG/1; MG/1
1 CAPSULE, EXTENDED RELEASE ORAL DAILY
Qty: 90 CAPSULE | Refills: 0 | Status: SHIPPED | OUTPATIENT
Start: 2024-03-20

## 2024-03-20 NOTE — PROGRESS NOTES
[facility] Visit Note  3/20/2024      Subjective:      Patient ID: Melanie Sanchez is a 54 year old female.    Chief Complaint:  Chief Complaint   Patient presents with    Weight Check     3 month f/u, down 1#. Currently taking Qsymia 7.5-46 mg.       HPI:  Melanie Sanchez is a 54 year old female who is being seen today for the above.        Lifestyle changes-  Didn't take qsymia for 1 mo total off an on over the last 3mo due to stress in the household with her 20 y/o daughter who is now back home.  Up 2# in 3 months. Felt phentermine 30mg worked better, but qsymia is still a help. Didn't loose 3% (6#) on it, but wasn't consistent with med and at least maintained.    Had been 6 months on phentermine and transition to Qsymia on 11/17/23, 210# at that visit.        Phentermine 30 mg was added 4/4/23. Med helps prevent the spiraling after making one bad decision and saying the hell with it and then make many bad decisions. Used to have bulemia.   Feels less focused on food. Making more rational decisions about eating than emotional decisions. Walking 2mi 4d/wk with TJ her life partner.   Has a little dry mouth. Stops the obsessions of eating.      History 4/4/23: used to exercise with a , has done weight watchers. Looses weight, but then regains it. Eating is emotional. Had an eating disorder and bad in her 20-30's. Binged and purged. Would do at the end of the night when all was done as her treat to herself. Having a family with a routine has helped and harder to do those things with others around. Will focus on other enjoyable activities, exercise, and eating healthy. Open to phentermine.      Taking: phentermine 30mg  Started at: 4/4/23, 245#, BMI 47.04  Since Last visit: gained 1#, 1 month  Total Difference: 33#, 6 months  Other changes: down 6 BMI points, less focused on food, making more rational as opposed to emotional decision, clothes are fitting better.     Phentermine  scripts: 1st- 4/4/23, 2nd- 5/4/23, 3rd- 7/5/23, 4th- 8/7/23, 5th-9/11/23, 6th- 10/16/23     Denies- chest pain, palpitations, sob, syncope      Review of Systems - as stated above in the HPI      Objective:     Vitals:    03/20/24 0708   BP: 114/70   Pulse: 72   Resp: 16   Temp: 98.2 °F (36.8 °C)   TempSrc: Temporal   SpO2: 99%   Weight: 212 lb (96.2 kg)   Height: 5' (1.524 m)       Physical Examination   General:  Alert, in no acute distress  HEENT: NCAT, EOMI, normal TMs, oropharynx, and nasal turbinates.  Neck:  No cervical lymphadenopathy  CV: Regular rate and rhythm. No murmurs, gallops, or rubs.  Lungs:  Clear to auscultation B, no wheezes, rales, or rhonchi, normal respiratory effort  Abd:  +bowel sounds, soft  Ext:  No pedal edema,  Pedal pulses 2+ B        Assessment:     1. Class 3 severe obesity due to excess calories without serious comorbidity with body mass index (BMI) of 40.0 to 44.9 in adult (HCC)  - Phentermine-Topiramate (QSYMIA) 7.5-46 MG Oral Capsule SR 24 Hr; Take 1 Capful by mouth daily.  Dispense: 90 capsule; Refill: 0  -didn't loose 3%/6# on qysimia in 3mo, but wasn't consistent with med, so will cont same dose for now. If consistent with med and no 6# wt loss to consider increasing the dose.      Return in about 3 months (around 6/20/2024) for f/u chronic obesity.

## 2024-03-21 ENCOUNTER — TELEPHONE (OUTPATIENT)
Dept: FAMILY MEDICINE CLINIC | Facility: CLINIC | Age: 54
End: 2024-03-21

## 2024-03-21 NOTE — TELEPHONE ENCOUNTER
Received via fax a copy of the patients 2/21/24 Routine Screening Mammogram report from Dr. Kirby Turner / [facility]. Report placed in Dr. Perrin in box for review.

## 2024-04-23 ENCOUNTER — OFFICE VISIT (OUTPATIENT)
Dept: FAMILY MEDICINE CLINIC | Facility: CLINIC | Age: 54
End: 2024-04-23
Payer: COMMERCIAL

## 2024-04-23 VITALS
HEART RATE: 84 BPM | RESPIRATION RATE: 16 BRPM | HEIGHT: 60 IN | TEMPERATURE: 98 F | OXYGEN SATURATION: 97 % | BODY MASS INDEX: 42.21 KG/M2 | WEIGHT: 215 LBS | SYSTOLIC BLOOD PRESSURE: 124 MMHG | DIASTOLIC BLOOD PRESSURE: 74 MMHG

## 2024-04-23 DIAGNOSIS — R05.2 SUBACUTE COUGH: ICD-10-CM

## 2024-04-23 DIAGNOSIS — J45.31 MILD PERSISTENT ASTHMA WITH ACUTE EXACERBATION (HCC): Primary | ICD-10-CM

## 2024-04-23 PROCEDURE — 99213 OFFICE O/P EST LOW 20 MIN: CPT | Performed by: FAMILY MEDICINE

## 2024-04-23 PROCEDURE — 96127 BRIEF EMOTIONAL/BEHAV ASSMT: CPT | Performed by: FAMILY MEDICINE

## 2024-04-23 RX ORDER — MONTELUKAST SODIUM 10 MG/1
10 TABLET ORAL NIGHTLY
Qty: 30 TABLET | Refills: 0 | Status: SHIPPED | OUTPATIENT
Start: 2024-04-23

## 2024-04-23 NOTE — PROGRESS NOTES
[facility] Visit Note  4/23/2024      Subjective:      Patient ID: Melanie Sanchez is a 54 year old female.    Chief Complaint:  Chief Complaint   Patient presents with    Cough     x 3 weeks       HPI:  Melanie Sanchez is a 54 year old female who is being seen today for the above.      Cough- x 3 wks. Before pandemic  Says once she gets sick she can't get rid of a cough. Doesn't feels she was sick to start this round. Worse in the last 5-6d and at bedtime.  In the AM has a productive cough.   +allergies upon testing  Takes ceterizine due to skin itching and will bleed if doesn't take meds from scratching.     Alleviating factors- cough drops, albuterol inhaler.       Has asthma and takes cetirizine 10mg, beclomethasone dipropionate  (Qvar) 80mcg inhaler 2 puffs BID only during certain seasons and restarted it 3 wks ago.  Albuterol prn  Triggers: exercise, viral uri,       Denies- sob, leg swelling, orthopnea, paroxysymal nocturnal dyspnea, post-nasal drip, runny/stuffy nose.      Review of Systems - as stated above in the HPI      Objective:     Vitals:    04/23/24 1446   BP: 124/74   Pulse: 84   Resp: 16   Temp: 98.1 °F (36.7 °C)   TempSrc: Oral   SpO2: 97%   Weight: 215 lb (97.5 kg)   Height: 5' (1.524 m)       Physical Examination   General:  Alert, in no acute distress  HEENT: NCAT, EOMI, normal TMs, oropharynx, and nasal turbinates.  Neck:  No cervical lymphadenopathy  CV: Regular rate and rhythm. No murmurs, gallops, or rubs.  Lungs:  Clear to auscultation B, no wheezes, rales, or rhonchi, normal respiratory effort  Abd:  +bowel sounds, soft, obese  Ext:  No pedal edema,  Pedal pulses 2+ B        Assessment:     1. Mild persistent asthma with acute exacerbation (HCC)  - montelukast 10 MG Oral Tab; Take 1 tablet (10 mg total) by mouth nightly.  Dispense: 30 tablet; Refill: 0    2. Subacute cough  - montelukast 10 MG Oral Tab; Take 1 tablet (10 mg total) by mouth nightly.  Dispense: 30  tablet; Refill: 0    -suspect flare-up of asthma due to allergies  -if not resolving to consider CXR, increase inhaler, referral to pulm  -diff dx: allergies, asthma exacerbation, vs other    Return in about 3 weeks (around 5/14/2024) for f/u asthma (virtual visit ok).

## 2024-05-16 DIAGNOSIS — R05.2 SUBACUTE COUGH: ICD-10-CM

## 2024-05-16 DIAGNOSIS — J45.31 MILD PERSISTENT ASTHMA WITH ACUTE EXACERBATION (HCC): ICD-10-CM

## 2024-05-23 RX ORDER — MONTELUKAST SODIUM 10 MG/1
10 TABLET ORAL NIGHTLY
Qty: 90 TABLET | Refills: 0 | Status: SHIPPED | OUTPATIENT
Start: 2024-05-23

## 2024-06-05 ENCOUNTER — TELEPHONE (OUTPATIENT)
Dept: FAMILY MEDICINE CLINIC | Facility: CLINIC | Age: 54
End: 2024-06-05

## 2024-06-05 NOTE — TELEPHONE ENCOUNTER
PA was submitted and approved today via Cover My Meds for the Phentermine-Topiramate (QSYMIA) 7.5-46 MG Oral Capsule SR 24 Hr.          Outcome  Approved today  CaseId:[id number];Status:Approved  Review Type:Prior Auth;Coverage   Start Date:05/06/2024;Coverage End Date:12/02/2024;    Authorization Expiration Date: 12/1/2024.    [facility] notified of the approval.

## 2024-06-05 NOTE — TELEPHONE ENCOUNTER
Received request from  [facility]/CoverMyMeds that a Prior Authorization is needed for PA for Qsymia 7.5-46MG ER. Key# [id number]).  Paperwork placed in MA folder.

## 2024-06-19 NOTE — PROGRESS NOTES
[facility] Visit Note  6/19/2024      Subjective:      Patient ID: Melanie Sanchez is a 54 year old female.    Chief Complaint:  Chief Complaint   Patient presents with    Weight Check     Here for 3 month f/u. Was on Phentermine for 6 months then transition to Qsymia on 11/17/23. Currently taking QSYMIA 7.5-46 mg,  gained 1# in 3 months.       HPI:  Melanie Sanchez is a 54 year old female who is being seen today for the above.        Chronic obesity- 3 month f/u. Was on Phentermine for 6 months then transition to Qsymia on 11/17/23. Currently taking QSYMIA 7.5-46 mg,  gained 1# in 3 months. Was looking for ~7# wt loss. Will increase to higher dose and see her back in 3mo. Informed her of expectation of losing 7#/3% of body weight.        F/u asthma- montelukast helped to get her through her illness and now doing fine. Not needing it daily now.    Review of Systems - as stated above in the HPI      Objective:     Vitals:    06/20/24 0702   BP: 120/72   Pulse: 83   Resp: 16   Temp: 97.9 °F (36.6 °C)   TempSrc: Temporal   SpO2: 99%   Weight: 213 lb (96.6 kg)   Height: 5' (1.524 m)       Physical Examination   General:  Alert, in no acute distress  HEENT: NCAT, EOMI, mucus membranes moist   Neck:  No cervical lymphadenopathy  CV: Regular rate and rhythm. No murmurs, gallops, or rubs.  Lungs:  Clear to auscultation B, no wheezes, rales, or rhonchi, normal respiratory effort  Abd:  +bowel sounds, soft  Ext:  No pedal edema,  Pedal pulses 2+ B        Assessment:     1. Class 3 severe obesity due to excess calories without serious comorbidity with body mass index (BMI) of 40.0 to 44.9 in adult (HCC)  - Phentermine-Topiramate (QSYMIA) 11.25-69 MG Oral Capsule SR 24 Hr; Take 1 Capful by mouth every morning for 14 days.  Dispense: 14 capsule; Refill: 0  - Phentermine-Topiramate (QSYMIA) 15-92 MG Oral Capsule SR 24 Hr; Take 1 capsule by mouth every morning.  Dispense: 90 capsule; Refill: 0    2. Mild  persistent asthma without complication (HCC)  -doing well, use montelukast prn      Return in about 3 months (around 9/20/2024) for annual physical and qysmia f/u.

## 2024-06-20 ENCOUNTER — OFFICE VISIT (OUTPATIENT)
Dept: FAMILY MEDICINE CLINIC | Facility: CLINIC | Age: 54
End: 2024-06-20

## 2024-06-20 VITALS
TEMPERATURE: 98 F | HEART RATE: 83 BPM | WEIGHT: 213 LBS | BODY MASS INDEX: 41.82 KG/M2 | OXYGEN SATURATION: 99 % | HEIGHT: 60 IN | DIASTOLIC BLOOD PRESSURE: 72 MMHG | SYSTOLIC BLOOD PRESSURE: 120 MMHG | RESPIRATION RATE: 16 BRPM

## 2024-06-20 DIAGNOSIS — E66.01 CLASS 3 SEVERE OBESITY DUE TO EXCESS CALORIES WITHOUT SERIOUS COMORBIDITY WITH BODY MASS INDEX (BMI) OF 40.0 TO 44.9 IN ADULT (HCC): Primary | ICD-10-CM

## 2024-06-20 DIAGNOSIS — J45.30 MILD PERSISTENT ASTHMA WITHOUT COMPLICATION (HCC): ICD-10-CM

## 2024-06-20 PROCEDURE — 99214 OFFICE O/P EST MOD 30 MIN: CPT | Performed by: FAMILY MEDICINE

## 2024-06-20 RX ORDER — PHENTERMINE AND TOPIRAMATE 15; 92 MG/1; MG/1
1 CAPSULE, EXTENDED RELEASE ORAL EVERY MORNING
Qty: 90 CAPSULE | Refills: 0 | Status: SHIPPED | OUTPATIENT
Start: 2024-06-20 | End: 2024-09-18

## 2024-06-20 RX ORDER — PHENTERMINE AND TOPIRAMATE 11.25; 69 MG/1; MG/1
1 CAPSULE, EXTENDED RELEASE ORAL EVERY MORNING
Qty: 14 CAPSULE | Refills: 0 | Status: SHIPPED | OUTPATIENT
Start: 2024-06-20 | End: 2024-07-04

## 2024-09-20 ENCOUNTER — OFFICE VISIT (OUTPATIENT)
Dept: FAMILY MEDICINE CLINIC | Facility: CLINIC | Age: 54
End: 2024-09-20
Payer: COMMERCIAL

## 2024-09-20 VITALS
HEART RATE: 82 BPM | WEIGHT: 205 LBS | OXYGEN SATURATION: 98 % | HEIGHT: 60 IN | DIASTOLIC BLOOD PRESSURE: 70 MMHG | TEMPERATURE: 99 F | RESPIRATION RATE: 16 BRPM | BODY MASS INDEX: 40.25 KG/M2 | SYSTOLIC BLOOD PRESSURE: 112 MMHG

## 2024-09-20 DIAGNOSIS — Z00.00 LABORATORY EXAM ORDERED AS PART OF ROUTINE GENERAL MEDICAL EXAMINATION: ICD-10-CM

## 2024-09-20 DIAGNOSIS — E66.01 CLASS 3 SEVERE OBESITY DUE TO EXCESS CALORIES WITHOUT SERIOUS COMORBIDITY WITH BODY MASS INDEX (BMI) OF 40.0 TO 44.9 IN ADULT (HCC): Primary | ICD-10-CM

## 2024-09-20 PROCEDURE — 99213 OFFICE O/P EST LOW 20 MIN: CPT | Performed by: FAMILY MEDICINE

## 2024-09-20 RX ORDER — PHENTERMINE AND TOPIRAMATE 15; 92 MG/1; MG/1
1 CAPSULE, EXTENDED RELEASE ORAL EVERY MORNING
Qty: 30 CAPSULE | Refills: 0 | Status: SHIPPED | OUTPATIENT
Start: 2024-09-20

## 2024-09-20 RX ORDER — PHENTERMINE AND TOPIRAMATE 15; 92 MG/1; MG/1
1 CAPSULE, EXTENDED RELEASE ORAL EVERY MORNING
COMMUNITY
End: 2024-09-20

## 2024-09-20 NOTE — PROGRESS NOTES
[facility] Visit Note  9/20/2024      Subjective:      Patient ID: Melanie Sanchez is a 54 year old female.    Chief Complaint:  Chief Complaint   Patient presents with    Weight Check     3 month f/u, down 8#.       HPI:  Melanie Sanchez is a 54 year old female who is being seen today for the above.      Chronic obesity- 3 month f/u after switching to higher dose of phentermine 15-92 with 8# wt loss in 3 mo. Had a very stressful summer and that is when she usually gains but she lost 8 pounds during this time so I feel that she was still doing a great job with her diet or exercise despite all the stress and and proud of her.  Will have her return in 3 mo and if doing well still maddie see her 1 q 6mo with call at 3 mo mell for refill.      History 6/20/24: Was on Phentermine for 6 months then transition to Qsymia on 11/17/23. Currently taking QSYMIA 7.5-46 mg,  gained 1# in 3 months. Was looking for ~7# wt loss. Will increase to higher dose and see her back in 3mo. Informed her of expectation of losing 7#/3% of body weight.       Review of Systems - as stated above in the HPI      Objective:     Vitals:    09/20/24 0709   BP: 112/70   Pulse: 82   Resp: 16   Temp: 98.6 °F (37 °C)   TempSrc: Temporal   SpO2: 98%   Weight: 205 lb (93 kg)   Height: 5' (1.524 m)       Physical Examination   General:  Alert, in no acute distress  HEENT: NCAT, EOMI, mucus membranes moist   Neck:  No cervical lymphadenopathy  CV: Regular rate and rhythm. No murmurs, gallops, or rubs.  Lungs:  Clear to auscultation B, no wheezes, rales, or rhonchi, normal respiratory effort  Abd:  +bowel sounds, soft  Ext:  No pedal edema,  Pedal pulses 2+ B        Assessment:     1. Class 3 severe obesity due to excess calories without serious comorbidity with body mass index (BMI) of 40.0 to 44.9 in adult (HCC)  - Phentermine-Topiramate (QSYMIA) 15-92 MG Oral Capsule SR 24 Hr; Take 1 capsule by mouth every morning.  Dispense: 30  capsule; Refill: 0    2. Laboratory exam ordered as part of routine general medical examination  - CBC [6399] [Q]  - COMP METABOLIC PANEL [82412] [Q]  - LIPID PANEL [3520] [Q]        Return in about 3 months (around 12/20/2024).

## 2024-09-24 LAB
ABSOLUTE BASOPHILS: 39 CELLS/UL (ref 0–200)
ABSOLUTE EOSINOPHILS: 163 CELLS/UL (ref 15–500)
ABSOLUTE LYMPHOCYTES: 1963 CELLS/UL (ref 850–3900)
ABSOLUTE MONOCYTES: 774 CELLS/UL (ref 200–950)
ABSOLUTE NEUTROPHILS: 3562 CELLS/UL (ref 1500–7800)
ALBUMIN/GLOBULIN RATIO: 1.5 (CALC) (ref 1–2.5)
ALBUMIN: 4.1 G/DL (ref 3.6–5.1)
ALKALINE PHOSPHATASE: 95 U/L (ref 37–153)
ALT: 17 U/L (ref 6–29)
AST: 19 U/L (ref 10–35)
BASOPHILS: 0.6 %
BILIRUBIN, TOTAL: 0.3 MG/DL (ref 0.2–1.2)
BUN: 10 MG/DL (ref 7–25)
CALCIUM: 8.7 MG/DL (ref 8.6–10.4)
CARBON DIOXIDE: 23 MMOL/L (ref 20–32)
CHLORIDE: 107 MMOL/L (ref 98–110)
CHOL/HDLC RATIO: 3 (CALC)
CHOLESTEROL, TOTAL: 179 MG/DL
CREATININE: 0.8 MG/DL (ref 0.5–1.03)
EGFR: 86 ML/MIN/1.73M2
EOSINOPHILS: 2.5 %
GLOBULIN: 2.7 G/DL (CALC) (ref 1.9–3.7)
GLUCOSE: 88 MG/DL (ref 65–99)
HDL CHOLESTEROL: 60 MG/DL
HEMATOCRIT: 45.4 % (ref 35–45)
HEMOGLOBIN: 14.9 G/DL (ref 11.7–15.5)
LDL-CHOLESTEROL: 102 MG/DL (CALC)
LYMPHOCYTES: 30.2 %
MCH: 29.3 PG (ref 27–33)
MCHC: 32.8 G/DL (ref 32–36)
MCV: 89.2 FL (ref 80–100)
MONOCYTES: 11.9 %
MPV: 9.4 FL (ref 7.5–12.5)
NEUTROPHILS: 54.8 %
NON-HDL CHOLESTEROL: 119 MG/DL (CALC)
PLATELET COUNT: 296 THOUSAND/UL (ref 140–400)
POTASSIUM: 4.4 MMOL/L (ref 3.5–5.3)
PROTEIN, TOTAL: 6.8 G/DL (ref 6.1–8.1)
RDW: 13.7 % (ref 11–15)
RED BLOOD CELL COUNT: 5.09 MILLION/UL (ref 3.8–5.1)
SODIUM: 137 MMOL/L (ref 135–146)
TRIGLYCERIDES: 77 MG/DL
WHITE BLOOD CELL COUNT: 6.5 THOUSAND/UL (ref 3.8–10.8)

## 2024-11-04 DIAGNOSIS — E66.813 CLASS 3 SEVERE OBESITY DUE TO EXCESS CALORIES WITHOUT SERIOUS COMORBIDITY WITH BODY MASS INDEX (BMI) OF 40.0 TO 44.9 IN ADULT (HCC): ICD-10-CM

## 2024-11-04 DIAGNOSIS — E66.01 CLASS 3 SEVERE OBESITY DUE TO EXCESS CALORIES WITHOUT SERIOUS COMORBIDITY WITH BODY MASS INDEX (BMI) OF 40.0 TO 44.9 IN ADULT (HCC): ICD-10-CM

## 2024-11-04 NOTE — TELEPHONE ENCOUNTER
Requesting Qsymia 15-92mg  Last OV: 9/20/24  RTC: 3 months  Last Rx'd 9/20/24 #30 with 0 refills    Future Appointments   Date Time Provider Department Center   12/20/2024  8:00 AM Nicky Perrin MD [facility] [address] [facility] [address]       [place] , last dispensed 9/20/24 #30    Controlled med:  Rx pended and routed for approval/denial

## 2024-11-05 RX ORDER — PHENTERMINE AND TOPIRAMATE 15; 92 MG/1; MG/1
1 CAPSULE, EXTENDED RELEASE ORAL EVERY MORNING
Qty: 90 CAPSULE | Refills: 0 | Status: SHIPPED | OUTPATIENT
Start: 2024-11-05

## 2024-12-18 ENCOUNTER — TELEPHONE (OUTPATIENT)
Dept: FAMILY MEDICINE CLINIC | Facility: CLINIC | Age: 54
End: 2024-12-18

## 2024-12-18 NOTE — TELEPHONE ENCOUNTER
Prior Authorizationwas submitted and approved via [employer] for the Phentermine-Topiramate (QSYMIA) 15-92 MG Oral Capsule SR 24 Hr.  Coverage Start Date:11/18/2024,   Coverage End Date:12/18/2025    Approved   12/18/2024  4:15 PM  Appeal supported: No   Note from payer: CaseId:[id number];Status:Approved;Review Type:Prior Auth;Coverage Start Date:11/18/2024;Coverage End Date:12/18/2025;Phentermine-Topiramate (QSYMIA) 15-92 MG Oral Capsule SR 24 Hr    Payer: [employer] Case ID: [id number]    [phone]    [phone]  Waiting for Payer Response   12/18/2024  4:15 PM  Deadline to reply: January 2, 2025 11:01 AM Sending user: Rosalia Snyder MA   Note to payer: Patient is being treated with the requested medication for chronic obesity. Her BMI on 4/4/23 was 47.06 kg/m2 and was 40.04 kg/m2 on 9/20/24. See attached chart notes.   Payer: [employer] Case ID: [id number]    [phone]    [phone]  Attachment:  Document: ePA Attachment Phentermine-Topiramate (QSYMIA) 15-92 MG Oral Capsule SR 24 Hr 12/18/2024- 4:14 PM  Traditional Prior Auth   Please provide all information requested. Failure to complete this form in its entirety may result in delayed processing or the PA request will be closed for lack of information  Question Answer   What is the indication or diagnosis? Weight loss   Will the requested medication be taken concomitantly with other weight loss medications? Please Note: examples of medications FDA-approved for weight loss include phentermine, benzphetamine, diethylpropion, phendimetrazine, Contrave, Xenical, Saxenda (liraglutide subcutaneous injection), and Wegovy (semaglutide subcutaneous injection). Additionally, Ezequiel (orlistat 60 mg capsules) is available over-the-counter. No   Is the request for initial therapy or continuation of therapy? Continuation of therapy   Has the patient completed at least 6 months of therapy with the requested medication? Yes   Did the  patient have an initial body mass index (BMI) greater than or equal to 30 kilograms per meter squared (30 kg/m2)? Yes   Is the patient currently engaged in behavioral modification and on a reduced calorie diet? Yes   Has the patient lost greater than or equal to 5% of baseline body weight? Yes         Coverage Start Date:11/18/2024,   Coverage End Date:12/18/2025    Sofía at [facility] notified of the approval.

## 2025-01-16 ENCOUNTER — OFFICE VISIT (OUTPATIENT)
Dept: FAMILY MEDICINE CLINIC | Facility: CLINIC | Age: 55
End: 2025-01-16
Payer: COMMERCIAL

## 2025-01-16 VITALS
BODY MASS INDEX: 38.68 KG/M2 | WEIGHT: 197 LBS | TEMPERATURE: 98 F | OXYGEN SATURATION: 98 % | SYSTOLIC BLOOD PRESSURE: 112 MMHG | RESPIRATION RATE: 16 BRPM | DIASTOLIC BLOOD PRESSURE: 70 MMHG | HEART RATE: 88 BPM | HEIGHT: 60 IN

## 2025-01-16 DIAGNOSIS — Z23 NEED FOR SHINGLES VACCINE: ICD-10-CM

## 2025-01-16 DIAGNOSIS — E66.813 CLASS 3 SEVERE OBESITY DUE TO EXCESS CALORIES WITHOUT SERIOUS COMORBIDITY WITH BODY MASS INDEX (BMI) OF 40.0 TO 44.9 IN ADULT (HCC): ICD-10-CM

## 2025-01-16 DIAGNOSIS — E66.01 CLASS 3 SEVERE OBESITY DUE TO EXCESS CALORIES WITHOUT SERIOUS COMORBIDITY WITH BODY MASS INDEX (BMI) OF 40.0 TO 44.9 IN ADULT (HCC): ICD-10-CM

## 2025-01-16 DIAGNOSIS — Z00.00 ROUTINE MEDICAL EXAM: Primary | ICD-10-CM

## 2025-01-16 DIAGNOSIS — Z12.31 ENCOUNTER FOR SCREENING MAMMOGRAM FOR MALIGNANT NEOPLASM OF BREAST: ICD-10-CM

## 2025-01-16 DIAGNOSIS — Z28.21 INFLUENZA VACCINATION DECLINED BY PATIENT: ICD-10-CM

## 2025-01-16 RX ORDER — PHENTERMINE AND TOPIRAMATE 15; 92 MG/1; MG/1
1 CAPSULE, EXTENDED RELEASE ORAL EVERY MORNING
Qty: 90 CAPSULE | Refills: 0 | Status: SHIPPED | OUTPATIENT
Start: 2025-01-16

## 2025-01-16 NOTE — PROGRESS NOTES
Chief Complaint   Patient presents with    Physical     Reviewed Preventative/Wellness form with patient.         HPI:  Melanie Sanchez is a 54 year old female here today for preventative visit.    Sister passed at 54y/o unexpectedly Then she got covid. on 24.  24. She has   Parents 81 & 81y/o.   Yovani in [place] since 69 & 69y/o  Sees her at least 4x/wk.        Imms- up to date with tdap, covid, & shingrix # 1. Discussed shingrix #2, & flu.        Cervical cancer screening- No h/o abnormal paps, HPV, or genital warts. Normal co-testing 22 with Dr. Kirby Turner. through [facility]. Repeat 5 yrs.         Breast cancer screening- Mother had breast cancer ~66y/o (mom's genetic test was neg). Normal mammogram 24.        Colon cancer screening- No family h/o colon cancer. C-scope 23 repeat 5 yrs, Dr. Ang, [facility].        Osteoporosis screening- no reason identified for early screening with dexa. Mom had osteoporosis and had breast cancer. Special needs sister confined to a wheelchair had osteoporosis.        Diet/exercise- has a h/o eating d/o binging and purging. Working on it        Dental/Eye Check up-  Recommended pt see dentist once every 6 months for a cleaning and once every year for an eye exam.        Mild intermittent asthma- doesn't normally take Q-justina unless it's the winter or has a cold b/c otherwise will cough a lot. Has run out of her Q-justina. Waking up with SOB.  H/o asthma     Triggers: cold weather, URI       Chronic obesity- Switched to higher dose of phentermine 15- on  with good wt loss and she continues to loose. Lost 8# more in the last 4 months and now below the 200# mell at 197#! Started wt loss journey on 23 at 245#. Down 48# total in almost 2 yrs.     History 24: Was on Phentermine for 6 months then transition to Qsymia on 23. Currently taking QSYMIA 7.5-46 mg,  gained 1# in 3 months. Was looking for ~7# wt  loss. Will increase to higher dose and see her back in 3mo. Informed her of expectation of losing 7#/3% of body weight.       Had an eating disorder and bad in her 20-30's. Binged and purged. Would do at the end of the night when all was done as her treat to herself. Having a family with a routine has helped and harder to do those things with others around. Will focus on other enjoyable activities, exercise, and eating healthy.  Taking: phentermine 30mg, then Qsymia 11/46mg- 11/17/23, then 15/92mg- 6/24  Started at: 4/4/23, 245#, BMI 47.04  Since Last visit: lost 8#, 4 months  Total Difference: 48#, 21 months  Other changes:     Denies- chest pain, palpitations, sob, syncope, difficulty sleeping            Past Medical History:    Back problem    Binge-eating and purging type anorexia nervosa    20-30's was bad    Mild intermittent asthma (HCC)    Triggers: cold weather, URI    Mild persistent asthma without complication (HCC)    Osteoarthritis    Visual impairment    glasses     Past Surgical History:   Procedure Laterality Date    Amputation finger/thumb Right     partial 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, caught underneath a table    Appendectomy      Cholecystectomy      Colonoscopy N/A 09/06/2023    repeat 5 yrs, Aliya Ang DO;  Location:  ENDOSCOPY, [facility]    [place] teeth removed       Medications Ordered Prior to Encounter[1]  Allergies[2]  Social History     Socioeconomic History    Marital status: Single     Spouse name: Not on file    Number of children: Not on file    Years of education: Not on file    Highest education level: Not on file   Occupational History    Not on file   Tobacco Use    Smoking status: Never    Smokeless tobacco: Never   Vaping Use    Vaping status: Never Used   Substance and Sexual Activity    Alcohol use: Yes     Comment: 1/2 glass wine 1/yr, never a problem    Drug use: Never    Sexual activity: Yes     Partners: Male     Comment: common law male partner (menopause)   Other Topics  Concern    Not on file   Social History Narrative    Not on file     Social Drivers of Health     Financial Resource Strain: Not on file   Food Insecurity: Not on file   Transportation Needs: Not on file   Physical Activity: Not on file   Stress: Not on file   Social Connections: Not on file   Housing Stability: Not on file     Family History   Problem Relation Age of Onset    Cancer Mother         breast    Hypertension Mother     Other (khari's esophagitis) Mother     High Cholesterol Father     Prostate Cancer Father     Arthritis Father     Asthma Father     No Known Problems Sister     No Known Problems Sister     No Known Problems Maternal Grandmother     Stroke Paternal Grandmother     Heart Disease Paternal Grandmother     Cancer Paternal Grandfather         lung (smoker)    Thyroid disease Maternal Aunt        Review of Systems - All systems reviewed and negative except for HPI    PHYSICAL EXAM:  /70   Pulse 88   Temp 98.1 °F (36.7 °C) (Temporal)   Resp 16   Ht 5' (1.524 m)   Wt 197 lb (89.4 kg)   SpO2 98%   BMI 38.47 kg/m²   GENERAL APPEARANCE:  Alert, no acute distress, appears stated age  HEENT:  Head- Normocephalic, atraumatic.    Eyes- Extraocular movements intact, pupils equally round and reactive to light,  conjunctivae normal.    Ears- Tympanic membranes intact bilaterally.    Nose- Patent, normal septum and turbinates.    Mouth/Throat- Normal oral mucosa, throat non-erythematous.  NECK:  No submental, submandibular, ant/post cervical lymphadenopathy. No thyromegaly or masses.  PULMONARY:  Lungs clear to auscultation bilaterally. No wheezes, rales, or rhonchi. Normal respiratory effort.  CARDIOVASCULAR:  Regular rate and rhythm. No murmurs, gallops, or rubs.  ABDOMEN:  + bowel sounds, soft, nontender, nondistended. No hepatomegaly.  MUSCULOSKELETAL: Strength of upper and lower extremities 5/5 bilaterally. Normal gait.  NEUROLOGIC:  Cranial nerves 2-12 grossly intact.  PSYCHIATRIC:   Normal mood, affect, and hygiene.     ASSESSMENT/PLAN:    1. Routine medical exam    2. Encounter for screening mammogram for malignant neoplasm of breast  - [facility] OPHELIA 2D+3D SCREENING BILAT (CPT=77067/68966); Future    3. Influenza vaccination declined by patient  - Fluzone trivalent vaccine, PF 0.5mL, 6mo+ (78134)    4. Need for shingles vaccine  - Immunization Admin Counseling, 1st Component, 18 years and older  - Zoster Vac (Shingrix) in office vaccine- Non-Medicare or Medicare with ABN    5. Class 3 severe obesity due to excess calories without serious comorbidity with body mass index (BMI) of 40.0 to 44.9 in adult (HCC)  - Phentermine-Topiramate (QSYMIA) 15-92 MG Oral Capsule SR 24 Hr; Take 1 capsule by mouth every morning.  Dispense: 90 capsule; Refill: 0        Patient verbalized understanding and agrees to plan.      Return in about 6 months (around 7/16/2025) for f/u qsymia management (call in3 mo for refill).         [1]   Current Outpatient Medications on File Prior to Visit   Medication Sig Dispense Refill    montelukast 10 MG Oral Tab TAKE ONE TABLET BY MOUTH NIGHTLY 90 tablet 0    Beclomethasone Diprop HFA 80 MCG/ACT Inhalation Aerosol, Breath Activated Inhale 2 puffs into the lungs in the morning and 2 puffs before bedtime. 1 each 5    cetirizine 10 MG Oral Tab Take 1 tablet (10 mg total) by mouth 2 (two) times daily.      albuterol (PROAIR HFA) 108 (90 Base) MCG/ACT Inhalation Aero Soln Inhale 2 puffs into the lungs every 6 (six) hours as needed for Wheezing or Shortness of Breath. 1 each 2     No current facility-administered medications on file prior to visit.   [2]   Allergies  Allergen Reactions    Demerol Hcl [Meperidine] HALLUCINATION

## 2025-08-04 PROBLEM — F33.0 MILD EPISODE OF RECURRENT MAJOR DEPRESSIVE DISORDER: Status: ACTIVE | Noted: 2025-08-04

## 2025-08-13 ENCOUNTER — TELEPHONE (OUTPATIENT)
Dept: FAMILY MEDICINE CLINIC | Facility: CLINIC | Age: 55
End: 2025-08-13

## (undated) DEVICE — 1200CC GUARDIAN II: Brand: GUARDIAN

## (undated) DEVICE — 10FT COMBINED O2 DELIVERY/CO2 MONITORING. FILTER WITH MICROSTREAM TYPE LUER: Brand: DUAL ADULT NASAL CANNULA

## (undated) DEVICE — FLUIDGARD® 160 ANTI-FOG SURGICAL MASK WITH ANTI-GLARE SHIELD: Brand: PRECEPT ®

## (undated) DEVICE — LASSO POLYPECTOMY SNARE: Brand: LASSO

## (undated) DEVICE — TRAP SPEC REMOVAL ETRAP 15CM

## (undated) DEVICE — VALVE KIT SGL USE DEFENDO

## (undated) DEVICE — 3M™ RED DOT™ MONITORING ELECTRODE WITH FOAM TAPE AND STICKY GEL, 50/BAG, 20/CASE, 72/PLT 2570: Brand: RED DOT™

## (undated) DEVICE — STERIS KITS

## (undated) NOTE — LETTER
ASTHMA ACTION PLAN for Melanie Sanchez     : 1970     Date: 2024  Provider:  Nicky Perrin MD  Phone for doctor or clinic: [facility], [address] [place] [address]  [facility] [address] [phone]    ACT Score: 25      You can use the colors of a traffic light to help learn about your asthma medicines.      1. Green - Go! % of Personal Best Peak Flow Use controller medicine.   Breathing is good  No cough or wheeze  Can work and play Medicine How much to take When to take it    Qvar 80 mcg inhaler, 2 puffs twice daily.          2. Yellow - Caution. 50-79% Personal Best Peak  Flow.  Use reliever medicine to keep an asthma attack from getting bad.   Cough  Wheezing  Tight Chest  Wake up at night Medicine How much to take When to take it    Albuterol inhaler,  2 puffs every 6 (six)hours as needed.  Singulair (Montelukast) 10 mg by mouth daily as needed       Additional instructions         3. Red - Stop! Danger!  <50% Personal Best Peak  Flow. Take these medications until  Get help from a doctor   Medicine not helping  Breathing is hard and fast  Nose opens wide  Can't walk  Ribs show  Can't talk well Medicine How much to take When to take it    Albuterol Inhaler, 2 puffs every 20 minutes up to 3 times in a row for severe symptoms on the way to the Emergency Room.       Additional Instructions If your symptoms do not improve and you cannot contact your doctor, [facility] or call 911 immediately!     [x] Asthma Action Plan reviewed with patient (and caregiver if necessary) and a copy of the plan was given to the patient/caregiver.   [] Asthma Action Plan reviewed with patient (and caregiver if necessary) on the phone and mailed copy to patient or submitted via [employer].     Signatures:  Provider  Nicky Perrin MD   Patient Caretaker

## (undated) NOTE — LETTER
ASTHMA ACTION PLAN for Melanie Sanchez     : 1970     Date: 2025  Provider:  Nicky Perrin MD  Phone for doctor or clinic: [facility], [address] [place] [address]  [facility] [address] [phone]    ACT Score: 25      You can use the colors of a traffic light to help learn about your asthma medicines.      1. Green - Go! % of Personal Best Peak Flow Use controller medicine.   Breathing is good  No cough or wheeze  Can work and play Medicine How much to take When to take it    Qvar 80 mcg inhaler, 2 puffs twice daily.       2. Yellow - Caution. 50-79% Personal Best Peak  Flow.  Use reliever medicine to keep an asthma attack from getting bad.   Cough  Wheezing  Tight Chest  Wake up at night Medicine How much to take When to take it    Albuterol inhaler,  2 puffs every 6 (six)hours as needed.  Singulair (Montelukast) 10 mg by mouth daily as needed       Additional instructions         3. Red - Stop! Danger!  <50% Personal Best Peak  Flow. Take these medications until  Get help from a doctor   Medicine not helping  Breathing is hard and fast  Nose opens wide  Can't walk  Ribs show  Can't talk well Medicine How much to take When to take it    Albuterol Inhaler, 2 puffs every 20 minutes up to 3 times in a row for severe symptoms on the way to the Emergency Room.       Additional Instructions If your symptoms do not improve and you cannot contact your doctor, [facility] or call 911 immediately!     [x] Asthma Action Plan reviewed with patient (and caregiver if necessary) and a copy of the plan was given to the patient/caregiver.   [] Asthma Action Plan reviewed with patient (and caregiver if necessary) on the phone and mailed copy to patient or submitted via [employer].     Signatures:  Provider  Nicky Perrin MD   Patient Caretaker

## (undated) NOTE — LETTER
ASTHMA ACTION PLAN for Rafael Arthur     : 1970     Date: 2023  Provider:  Quan Lo MD  Phone for doctor or clinic: [facility], [address] [facility] , [address] [place] [id number]  [phone]    ACT Score: 14      You can use the colors of a traffic light to help learn about your asthma medicines. 1. Green - Go! % of Personal Best Peak Flow Use controller medicine. Breathing is good  No cough or wheeze  Can work and play Medicine How much to take When to take it    Qvar 80 mcg inhaler, 2 puffs twice daily        2. Yellow - Caution. 50-79% Personal Best Peak  Flow. Use reliever medicine to keep an asthma attack from getting bad. Cough  Wheezing  Tight Chest  Wake up at night Medicine How much to take When to take it    Albuterol inhaler,  2 puffs every 6 (six) hours as needed. Additional instructions         3. Red - Stop! Danger!  <50% Personal Best Peak  Flow. Take these medications until  Get help from a doctor   Medicine not helping  Breathing is hard and fast  Nose opens wide  Can't walk  Ribs show  Can't talk well Medicine How much to take When to take it    Albuterol Inhaler, 2 puffs every 20 minutes up to 3 times in a row for severe symptoms on the way to the Emergency Room. Additional Instructions If your symptoms do not improve and you cannot contact your doctor, [facility] or call 911 immediately! [x] Asthma Action Plan reviewed with patient (and caregiver if necessary) and a copy of the plan was given to the patient/caregiver. [] Asthma Action Plan reviewed with patient (and caregiver if necessary) on the phone and mailed copy to patient or submitted via [address]      Signatures:  Provider  Quan Lo MD   Patient Caretaker

## (undated) NOTE — LETTER
ASTHMA ACTION PLAN for Rafael Arthur     : 1970     Date: 2023  Provider:  Quan Lo MD  Phone for doctor or clinic: [facility], [address] [facility] , [address] [place] [id number]  [phone]    ACT Score: 25      You can use the colors of a traffic light to help learn about your asthma medicines. 1. Green - Go! % of Personal Best Peak Flow Use controller medicine. Breathing is good  No cough or wheeze  Can work and play Medicine How much to take When to take it    Qvar 80 mcg inhaler, 2 puffs twice daily      2. Yellow - Caution. 50-79% Personal Best Peak  Flow. Use reliever medicine to keep an asthma attack from getting bad. Cough  Wheezing  Tight Chest  Wake up at night Medicine How much to take When to take it    The above plus. .. Albuterol inhaler,  2 puffs every 6 (six) hours as needed       Additional instructions         3. Red - Stop! Danger!  <50% Personal Best Peak  Flow. Take these medications until  Get help from a doctor   Medicine not helping  Breathing is hard and fast  Nose opens wide  Can't walk  Ribs show  Can't talk well Medicine How much to take When to take it    Albuterol Inhaler, 2 puffs every 20 minutes up to 3 times in a row for severe symptoms on the way to the Emergency Room. Additional Instructions If your symptoms do not improve and you cannot contact your doctor, [facility] or call 911 immediately! [x] Asthma Action Plan reviewed with patient (and caregiver if necessary) and a copy of the plan was given to the patient/caregiver. [] Asthma Action Plan reviewed with patient (and caregiver if necessary) on the phone and mailed copy to patient or submitted via [address]      Signatures:  Provider  Quan Lo MD   Patient Caretaker

## (undated) NOTE — LETTER
ASTHMA ACTION PLAN for Melanie Sanchez     : 1970     Date: 2024  Provider:  Nicky Perrin MD  Phone for doctor or clinic: [facility], [address] [place] [address] 100  [facility] [address] [phone]    ACT Score: 13      You can use the colors of a traffic light to help learn about your asthma medicines.      1. Green - Go! % of Personal Best Peak Flow Use controller medicine.   Breathing is good  No cough or wheeze  Can work and play Medicine How much to take When to take it    Qvar 80 mcg inhaler, 2 puffs twice daily       2. Yellow - Caution. 50-79% Personal Best Peak  Flow.  Use reliever medicine to keep an asthma attack from getting bad.   Cough  Wheezing  Tight Chest  Wake up at night Medicine How much to take When to take it    The above plus...  Albuterol inhaler,  2 puffs every 6 (six) hours as needed  Singulair (Montelukast) 10 mg by mouth daily         Additional instructions         3. Red - Stop! Danger!  <50% Personal Best Peak  Flow. Take these medications until  Get help from a doctor   Medicine not helping  Breathing is hard and fast  Nose opens wide  Can't walk  Ribs show  Can't talk well Medicine How much to take When to take it    Albuterol Inhaler, 2 puffs every 20 minutes up to 3 times in a row for severe symptoms on the way to the Emergency Room.       Additional Instructions If your symptoms do not improve and you cannot contact your doctor, [facility] or call 911 immediately!     [x] Asthma Action Plan reviewed with patient (and caregiver if necessary) and a copy of the plan was given to the patient/caregiver.   [] Asthma Action Plan reviewed with patient (and caregiver if necessary) on the phone and mailed copy to patient or submitted via [employer].     Signatures:  Provider  Nicky Perrin MD   Patient Caretaker